# Patient Record
Sex: MALE | Race: BLACK OR AFRICAN AMERICAN | NOT HISPANIC OR LATINO | Employment: UNEMPLOYED | URBAN - METROPOLITAN AREA
[De-identification: names, ages, dates, MRNs, and addresses within clinical notes are randomized per-mention and may not be internally consistent; named-entity substitution may affect disease eponyms.]

---

## 2022-11-29 ENCOUNTER — OFFICE VISIT (OUTPATIENT)
Dept: URGENT CARE | Facility: CLINIC | Age: 22
End: 2022-11-29
Payer: COMMERCIAL

## 2022-11-29 VITALS
WEIGHT: 200 LBS | HEIGHT: 78 IN | RESPIRATION RATE: 18 BRPM | OXYGEN SATURATION: 98 % | SYSTOLIC BLOOD PRESSURE: 111 MMHG | HEART RATE: 56 BPM | DIASTOLIC BLOOD PRESSURE: 73 MMHG | BODY MASS INDEX: 23.14 KG/M2 | TEMPERATURE: 98 F

## 2022-11-29 DIAGNOSIS — S93.401A SPRAIN OF RIGHT ANKLE, UNSPECIFIED LIGAMENT, INITIAL ENCOUNTER: Primary | ICD-10-CM

## 2022-11-29 DIAGNOSIS — S99.911A INJURY OF RIGHT ANKLE, INITIAL ENCOUNTER: ICD-10-CM

## 2022-11-29 PROCEDURE — 3078F DIAST BP <80 MM HG: CPT | Mod: CPTII,S$GLB,,

## 2022-11-29 PROCEDURE — 3074F SYST BP LT 130 MM HG: CPT | Mod: CPTII,S$GLB,,

## 2022-11-29 PROCEDURE — 1159F MED LIST DOCD IN RCRD: CPT | Mod: CPTII,S$GLB,,

## 2022-11-29 PROCEDURE — 1160F RVW MEDS BY RX/DR IN RCRD: CPT | Mod: CPTII,S$GLB,,

## 2022-11-29 PROCEDURE — 3078F PR MOST RECENT DIASTOLIC BLOOD PRESSURE < 80 MM HG: ICD-10-PCS | Mod: CPTII,S$GLB,,

## 2022-11-29 PROCEDURE — 3008F BODY MASS INDEX DOCD: CPT | Mod: CPTII,S$GLB,,

## 2022-11-29 PROCEDURE — 3008F PR BODY MASS INDEX (BMI) DOCUMENTED: ICD-10-PCS | Mod: CPTII,S$GLB,,

## 2022-11-29 PROCEDURE — 99204 PR OFFICE/OUTPT VISIT, NEW, LEVL IV, 45-59 MIN: ICD-10-PCS | Mod: S$GLB,,,

## 2022-11-29 PROCEDURE — 1159F PR MEDICATION LIST DOCUMENTED IN MEDICAL RECORD: ICD-10-PCS | Mod: CPTII,S$GLB,,

## 2022-11-29 PROCEDURE — 73610 X-RAY EXAM OF ANKLE: CPT | Mod: RT,S$GLB,, | Performed by: RADIOLOGY

## 2022-11-29 PROCEDURE — 73610 XR ANKLE COMPLETE 3 VIEW RIGHT: ICD-10-PCS | Mod: RT,S$GLB,, | Performed by: RADIOLOGY

## 2022-11-29 PROCEDURE — 99204 OFFICE O/P NEW MOD 45 MIN: CPT | Mod: S$GLB,,,

## 2022-11-29 PROCEDURE — 1160F PR REVIEW ALL MEDS BY PRESCRIBER/CLIN PHARMACIST DOCUMENTED: ICD-10-PCS | Mod: CPTII,S$GLB,,

## 2022-11-29 PROCEDURE — 3074F PR MOST RECENT SYSTOLIC BLOOD PRESSURE < 130 MM HG: ICD-10-PCS | Mod: CPTII,S$GLB,,

## 2022-11-29 RX ORDER — NAPROXEN 500 MG/1
500 TABLET ORAL 2 TIMES DAILY
Qty: 10 TABLET | Refills: 0 | Status: SHIPPED | OUTPATIENT
Start: 2022-11-29 | End: 2022-12-04

## 2022-11-29 NOTE — PROGRESS NOTES
"Subjective:       Patient ID: Marvin Crowell is a 22 y.o. male.    Vitals:  height is 6' 9" (2.057 m) and weight is 90.7 kg (200 lb). His oral temperature is 97.8 °F (36.6 °C). His blood pressure is 111/73 and his pulse is 56 (abnormal). His respiration is 18 and oxygen saturation is 98%.     Chief Complaint: Ankle Injury    Patient stated he was playing basketball last night and went up to jump and inverted his right ankle. Patient stated he as not taken any motrin/tylenol. He iced it last night. Patient stated he can walk on it but it hurts.     Ankle Injury   The incident occurred 6 to 12 hours ago. The incident occurred at the gym. The injury mechanism was a twisting injury. The pain is present in the right ankle. The quality of the pain is described as aching. The pain is at a severity of 8/10. The pain is moderate. The pain has been Constant since onset. Associated symptoms include an inability to bear weight. Pertinent negatives include no loss of sensation, muscle weakness, numbness or tingling. He reports no foreign bodies present. The symptoms are aggravated by movement, palpation and weight bearing. He has tried ice for the symptoms. The treatment provided no relief.     Constitution: Negative for chills and fever.   Musculoskeletal:  Positive for pain, joint swelling, abnormal ROM of joint (decreased AROM of right ankle) and pain with walking. Negative for arthritis, muscle cramps and muscle ache.   Neurological:  Negative for passing out, loss of balance, numbness and tingling.     Objective:      Physical Exam   Constitutional: He is oriented to person, place, and time. He appears well-developed. He is cooperative. No distress.   HENT:   Head: Normocephalic and atraumatic.   Nose: Nose normal.   Mouth/Throat: Oropharynx is clear and moist and mucous membranes are normal.   Eyes: Conjunctivae and lids are normal.   Neck: Trachea normal and phonation normal. Neck supple.   Cardiovascular: Normal rate, " regular rhythm, normal heart sounds and normal pulses.   Pulmonary/Chest: Effort normal and breath sounds normal.   Abdominal: Normal appearance and bowel sounds are normal. He exhibits no mass. Soft.   Musculoskeletal:         General: Swelling, tenderness and signs of injury present. No deformity.      Right ankle: He exhibits decreased range of motion and swelling. He exhibits no ecchymosis. Tenderness. Lateral malleolus tenderness found.      Left ankle: He exhibits normal range of motion, no swelling and no ecchymosis. No tenderness. No lateral malleolus tenderness found.        Legs:       Right foot: No tenderness, swelling or deformity.      Left foot: No tenderness, swelling or deformity.      Comments: Right ankle swelling at lateral malleolus with no noted ecchymosis/erythema. Neurovascular intact at b/l feet. No TTP of b/l anterior/posterior surface of feet. Decreased AROM of right ankle flexion. Full AROM at MCP joints b/l.   Neurological: He is alert and oriented to person, place, and time. He has normal strength and normal reflexes. No sensory deficit.   Skin: Skin is warm, dry, intact and not diaphoretic.   Psychiatric: His speech is normal and behavior is normal. Judgment and thought content normal.   Nursing note and vitals reviewed.      XR ANKLE COMPLETE 3 VIEW RIGHT    Result Date: 11/29/2022  EXAMINATION: XR ANKLE COMPLETE 3 VIEW RIGHT CLINICAL HISTORY: Unspecified injury of right ankle, initial encounter TECHNIQUE: AP, lateral, and oblique images of the right ankle were performed. COMPARISON: None FINDINGS: Ankle mortise is preserved.  There is some soft tissue swelling by the lateral malleolus but I do not see enter lying fracture or other abnormality.     See above Electronically signed by: Favian Charles MD Date:    11/29/2022 Time:    13:05     Assessment:       1. Sprain of right ankle, unspecified ligament, initial encounter    2. Injury of right ankle, initial encounter           Plan:         Sprain of right ankle, unspecified ligament, initial encounter  -     naproxen (NAPROSYN) 500 MG tablet; Take 1 tablet (500 mg total) by mouth 2 (two) times daily. for 5 days  Dispense: 10 tablet; Refill: 0  -     CRUTCHES FOR HOME USE  -     Ambulatory referral/consult to Orthopedics  -     IMMOBILIZER FOR HOME USE    Injury of right ankle, initial encounter  -     XR ANKLE COMPLETE 3 VIEW RIGHT; Future; Expected date: 11/29/2022  -     naproxen (NAPROSYN) 500 MG tablet; Take 1 tablet (500 mg total) by mouth 2 (two) times daily. for 5 days  Dispense: 10 tablet; Refill: 0  -     CRUTCHES FOR HOME USE  -     Ambulatory referral/consult to Orthopedics     Patient was fitted for crutches and immobilizer in clinic.   -Take all medications as directed.    -Rest, elevate your affected extremity above the level of the heart, and apply ice for 20 minutes at a time 3-5 times daily over the next several days.   -Wear splint as directed.  -Follow up with the orthopedist in 1 week if you continue with pain.  -Sometimes it can take up to 1 week for stress fractures to show up on an X-ray.  You may need reimaging or a CT/MRI of the affected area if significant pain persists.     If your condition fails to improve in a timely manner, you should receive another evaluation by your Primary Care Provider to discuss your concerns or return to urgent care for a recheck.  If your condition worsens at any time, you should report immediately to your nearest Emergency Department for further evaluation. **You must understand that you have received Urgent Care treatment only and that you may be released before all of your medical problems are known or treated. You, the patient, are responsible to arrange for follow-up care as instructed.      Reviewed ankle x-ray with patient in PACS. Right ankle xray (AP/lateral/oblique)- no identifiable fracture noted, lateral swelling around right malleolus.      Discussed with patient  the importance of f/u with their primary care provider. Urged to go to the ER for any worsening signs or symptoms.

## 2022-11-29 NOTE — LETTER
November 29, 2022      [facility]  [address], [address]  [place] [address]  Phone: [phone]  Fax: [phone]       Patient: Marvin Crowell   YOB: 2000  Date of Visit: 11/29/2022    To Whom It May Concern:    Richard Crowell  was at [facility] on 11/29/2022. The patient may return to work/school on 11/30/22 with no restrictions. If you have any questions or concerns, or if I can be of further assistance, please do not hesitate to contact me.    Sincerely,    Linda Gonzalez PA-C

## 2022-11-29 NOTE — PATIENT INSTRUCTIONS
You must understand that you have received treatment at an Urgent Care facility only, and that you may be  released before all of your medical problems are known or treated. Urgent Care facilities are not equipped to  handle life threatening emergencies. It is recommended that you seek care at an Emergency Department for  further evaluation of worsening or concerning symptoms, or possibly life threatening conditions as  discussed.